# Patient Record
Sex: FEMALE | Race: BLACK OR AFRICAN AMERICAN | NOT HISPANIC OR LATINO | Employment: FULL TIME | URBAN - METROPOLITAN AREA
[De-identification: names, ages, dates, MRNs, and addresses within clinical notes are randomized per-mention and may not be internally consistent; named-entity substitution may affect disease eponyms.]

---

## 2017-11-15 ENCOUNTER — HOSPITAL ENCOUNTER (EMERGENCY)
Facility: HOSPITAL | Age: 42
Discharge: HOME | End: 2017-11-15
Attending: EMERGENCY MEDICINE
Payer: MEDICAID

## 2017-11-15 VITALS
RESPIRATION RATE: 18 BRPM | SYSTOLIC BLOOD PRESSURE: 153 MMHG | HEART RATE: 90 BPM | OXYGEN SATURATION: 96 % | BODY MASS INDEX: 43.49 KG/M2 | WEIGHT: 286 LBS | DIASTOLIC BLOOD PRESSURE: 93 MMHG | TEMPERATURE: 99 F

## 2017-11-15 DIAGNOSIS — L03.312 CELLULITIS OF BACK: ICD-10-CM

## 2017-11-15 DIAGNOSIS — L02.212 BACK ABSCESS: Primary | ICD-10-CM

## 2017-11-15 PROCEDURE — 10060 I&D ABSCESS SIMPLE/SINGLE: CPT

## 2017-11-15 PROCEDURE — 25000003 PHARM REV CODE 250: Performed by: EMERGENCY MEDICINE

## 2017-11-15 PROCEDURE — 99283 EMERGENCY DEPT VISIT LOW MDM: CPT | Mod: 25

## 2017-11-15 RX ORDER — LIDOCAINE HYDROCHLORIDE AND EPINEPHRINE 10; 10 MG/ML; UG/ML
10 INJECTION, SOLUTION INFILTRATION; PERINEURAL
Status: COMPLETED | OUTPATIENT
Start: 2017-11-15 | End: 2017-11-15

## 2017-11-15 RX ORDER — KETOROLAC TROMETHAMINE 10 MG/1
10 TABLET, FILM COATED ORAL
Status: COMPLETED | OUTPATIENT
Start: 2017-11-15 | End: 2017-11-15

## 2017-11-15 RX ORDER — HYDROCHLOROTHIAZIDE 25 MG/1
25 TABLET ORAL DAILY
COMMUNITY
End: 2020-01-24 | Stop reason: SDUPTHER

## 2017-11-15 RX ORDER — SULFAMETHOXAZOLE AND TRIMETHOPRIM 800; 160 MG/1; MG/1
1 TABLET ORAL 2 TIMES DAILY
Qty: 14 TABLET | Refills: 0 | Status: SHIPPED | OUTPATIENT
Start: 2017-11-15 | End: 2017-11-22

## 2017-11-15 RX ADMIN — LIDOCAINE HYDROCHLORIDE,EPINEPHRINE BITARTRATE 10 ML: 10; .01 INJECTION, SOLUTION INFILTRATION; PERINEURAL at 10:11

## 2017-11-15 RX ADMIN — KETOROLAC TROMETHAMINE 10 MG: 10 TABLET, FILM COATED ORAL at 11:11

## 2017-11-16 NOTE — DISCHARGE INSTRUCTIONS
For  CELLULITIS/ABSCESS, I advised patient to: keep area clean and dry; apply antibiotic ointment as prescribed; refrain from squeezing or irritating site; apply moist heat to help with pain and abscess drainage; and to take antibiotics as prescribed. Patient was instructed to follow up with primary care provider, urgent care facility, or the emergency room if symptoms worsen and they develop a fever greater than 102.5 °F, have pain unrelieved by OTC or prescription medications, and excessive swelling. Also instructed patient to follow up with provider in 24-48 hours for wound re-check and possible packing change.

## 2017-11-16 NOTE — ED NOTES
Procedure completed. Bulky gauze dressing applied . Patient notified that she needs to follow up in 2 days. Packing placed in lanced abscess. Patient tolerated procedure well. Awaiting  Discharge. At present.

## 2017-11-16 NOTE — ED PROVIDER NOTES
"Encounter Date: 11/15/2017       History     Chief Complaint   Patient presents with    Abscess     PT states "I have a boil on my back".     The history is provided by the patient.   Abscess    This is a new problem. The current episode started several days ago (4 days). The problem occurs rarely. The problem has been gradually worsening. The abscess is present on the back. Pain scale: mild. The abscess is characterized by redness, painfulness and swelling. Pertinent negatives include no anorexia, no decrease in physical activity, not sleeping less, not drinking less, no fever, no diarrhea, no vomiting, no congestion, no rhinorrhea, no sore throat, no decreased responsiveness and no cough. Her past medical history does not include atopy in family or skin abscesses in family. There were no sick contacts. She has received no recent medical care.     Review of patient's allergies indicates:  No Known Allergies  Past Medical History:   Diagnosis Date    Hypertension      Past Surgical History:   Procedure Laterality Date    CHOLECYSTECTOMY       History reviewed. No pertinent family history.  Social History   Substance Use Topics    Smoking status: Never Smoker    Smokeless tobacco: Not on file    Alcohol use No     Review of Systems   Constitutional: Negative for decreased responsiveness and fever.   HENT: Negative for congestion, rhinorrhea and sore throat.    Respiratory: Negative for cough and shortness of breath.    Cardiovascular: Negative for chest pain.   Gastrointestinal: Negative for anorexia, diarrhea, nausea and vomiting.   Genitourinary: Negative for dysuria.   Musculoskeletal: Negative for back pain.   Skin: Negative for rash.   Neurological: Negative for weakness.   Hematological: Does not bruise/bleed easily.   All other systems reviewed and are negative.      Physical Exam     Initial Vitals [11/15/17 2205]   BP Pulse Resp Temp SpO2   (!) 141/90 95 18 98.9 °F (37.2 °C) 98 %      MAP       107     " "    Physical Exam    Nursing note and vitals reviewed.  Constitutional: She appears well-developed and well-nourished.   HENT:   Head: Normocephalic and atraumatic.   Mouth/Throat: No oropharyngeal exudate.   Eyes: Conjunctivae and EOM are normal. Pupils are equal, round, and reactive to light.   Neck: Normal range of motion. Neck supple. No thyromegaly present.   Cardiovascular: Normal rate, regular rhythm, normal heart sounds and intact distal pulses. Exam reveals no gallop and no friction rub.    No murmur heard.  Pulmonary/Chest: Breath sounds normal. No respiratory distress. She has no wheezes. She has no rhonchi. She exhibits no tenderness.   Abdominal: Soft. Bowel sounds are normal. She exhibits no distension. There is no tenderness. There is no rebound and no guarding.   Musculoskeletal: Normal range of motion. She exhibits no edema or tenderness.   Lymphadenopathy:     She has no cervical adenopathy.   Neurological: She is alert and oriented to person, place, and time. She has normal strength. No cranial nerve deficit or sensory deficit.   Skin: Skin is warm and dry. No rash noted.        Psychiatric: She has a normal mood and affect. Her behavior is normal. Judgment and thought content normal.         ED Course   I & D - Incision and Drainage  Date/Time: 11/15/2017 10:30 PM  Performed by: PENNY MARIN JR  Authorized by: PENNY MARIN JR   Consent Done: Yes  Consent: Verbal consent obtained.  Risks and benefits: risks, benefits and alternatives were discussed  Consent given by: patient  Patient understanding: patient states understanding of the procedure being performed  Procedure consent: procedure consent matches procedure scheduled  Relevant documents: relevant documents present and verified  Site marked: the operative site was marked  Patient identity confirmed: , MRN, name, provided demographic data and verbally with patient  Time out: Immediately prior to procedure a "time out" was called to " verify the correct patient, procedure, equipment, support staff and site/side marked as required.  Type: abscess  Body area: trunk  Location details: back  Anesthesia: local infiltration    Anesthesia:  Local Anesthetic: lidocaine 1% with epinephrine  Anesthetic total: 8 mL  Patient sedated: no  Scalpel size: 11  Incision type: single straight  Complexity: simple  Drainage: pus,  bloody and  purulent  Drainage amount: copious  Wound treatment: incision,  wound left open,  drainage,  deloculation,  expression of material and  wound packed  Packing material: 1/4 in gauze  Complications: No  Estimated blood loss (mL): 4  Specimens: No  Implants: No  Patient tolerance: Patient tolerated the procedure well with no immediate complications        Labs Reviewed - No data to display       Vitals:    11/15/17 2205 11/15/17 2324   BP: (!) 141/90 (!) 153/93   Pulse: 95 90   Resp: 18 18   Temp: 98.9 °F (37.2 °C) 98.5 °F (36.9 °C)   TempSrc: Oral Oral   SpO2: 98% 96%   Weight: 129.7 kg (286 lb)            Imaging Results    None         Medications   lidocaine-EPINEPHrine 1%-1:100,000 injection 10 mL (10 mLs Other Given 11/15/17 2231)   ketorolac tablet 10 mg (10 mg Oral Given 11/15/17 2347)       11:10 PM - Re-evaluation: The patient is resting comfortably and is in no acute distress. She states that her symptoms have improved after treatment within ER. Discussed test results, shared treatment plan, specific conditions for return, and importance of follow up with patient and family.  She understands and agrees with the plan as discussed. Answered  her questions at this time. She has remained hemodynamically stable throughout the ED course and is appropriate for discharge home.     For  CELLULITIS/ABSCESS, I advised patient to: keep area clean and dry; apply antibiotic ointment as prescribed; refrain from squeezing or irritating site; apply moist heat to help with pain and abscess drainage; and to take antibiotics as prescribed.  Patient was instructed to follow up with primary care provider, urgent care facility, or the emergency room if symptoms worsen and they develop a fever greater than 102.5 °F, have pain unrelieved by OTC or prescription medications, and excessive swelling. Also instructed patient to follow up with provider in 24-48 hours for wound re-check and possible packing change.      Pre-hypertension/Hypertension: The pt has been informed that they may have pre-hypertension or hypertension based on a blood pressure reading in the ED. I recommend that the pt call the PCP listed on their discharge instructions or a physician of their choice this week to arrange f/u for further evaluation of possible pre-hypertension or hypertension.     Gayathri Fraga was given a handout which discussed their disease process, precautions, and instructions for follow-up and therapy.    Follow-up Information     [facility]. Schedule an appointment as soon as possible for a visit in 1 week.    Why:  or with your physician, For wound re-check  Contact information:  [id number]  [place] [address]  [phone]             [facility] - Internal Medicine. Schedule an appointment as soon as possible for a visit in 2 days.    Specialty:  Internal Medicine  Why:  or with your physician, For wound re-check  Contact information:  [address] [address] [id number] [phone]           [facility] In 2 days.    Specialty:  Emergency Medicine  Why:  As needed, If symptoms worsen, For wound re-check  Contact information:  [address] [address] [address]  [phone]                 Discharge Medication List as of 11/15/2017 11:10 PM      START taking these medications    Details   sulfamethoxazole-trimethoprim 800-160mg (BACTRIM DS) 800-160 mg Tab Take 1 tablet by mouth 2 (two) times daily., Starting Wed 11/15/2017, Until Wed 11/22/2017, Print                ED Diagnosis  1.  Back abscess    2. Cellulitis of back                             ED Course      Clinical Impression:   The primary encounter diagnosis was Back abscess. A diagnosis of Cellulitis of back was also pertinent to this visit.    Disposition:   Disposition: Discharged  Condition: Stable                        Anjel Gale Jr., MD  11/16/17 0254

## 2017-11-16 NOTE — ED NOTES
Patient medicated for pain prior to Dc. On arrival to room to DC patient she c/o pain. MD made aware, orders received and patient medicated. Patient ok to be Dc'd home post medication administration per Md. Patient amb out.

## 2017-11-16 NOTE — ED NOTES
Patient to the ER with the c/o of an abscess to middle of upper back area. She reports her sister squeezed it and said it was a Black head. Now patient has a red raised area noted to the middle of her back that she rates 10/10 pain. Denies fever. Area size is approx 6x6cm. No drainage noted.  Will continue to monitor.

## 2017-11-17 ENCOUNTER — HOSPITAL ENCOUNTER (EMERGENCY)
Facility: HOSPITAL | Age: 42
Discharge: HOME | End: 2017-11-17
Attending: EMERGENCY MEDICINE
Payer: MEDICAID

## 2017-11-17 VITALS
DIASTOLIC BLOOD PRESSURE: 108 MMHG | BODY MASS INDEX: 40.8 KG/M2 | TEMPERATURE: 99 F | OXYGEN SATURATION: 99 % | SYSTOLIC BLOOD PRESSURE: 186 MMHG | HEIGHT: 70 IN | HEART RATE: 86 BPM | WEIGHT: 285 LBS | RESPIRATION RATE: 20 BRPM

## 2017-11-17 DIAGNOSIS — L02.212 ABSCESS OF BACK: Primary | ICD-10-CM

## 2017-11-17 PROCEDURE — 99283 EMERGENCY DEPT VISIT LOW MDM: CPT

## 2017-11-17 NOTE — ED NOTES
Pt stable, in NAD, and states no further needs at this time.MD aware of vitals. Pt took home meds just PTA.  Pt to be d/c'd home.

## 2017-11-22 NOTE — ED PROVIDER NOTES
Encounter Date: 11/17/2017       History     Chief Complaint   Patient presents with    Abscess     back     Patient presents for evaluation of a cutaneous abscess having recently undergone I&D at the site in our ED. Lesion is located in the mid back. Onset was noted several days ago..  Symptoms have gradually worsened. Abscess has associated symptoms of spontaneous drainage. Patient does not have previous history of cutaneous abscesses. Patient does not have diabetes.          Review of patient's allergies indicates:  No Known Allergies  Past Medical History:   Diagnosis Date    Hypertension      Past Surgical History:   Procedure Laterality Date    CHOLECYSTECTOMY       History reviewed. No pertinent family history.  Social History   Substance Use Topics    Smoking status: Never Smoker    Smokeless tobacco: Never Used    Alcohol use No     Review of Systems   Constitutional: Negative for chills and fever.   HENT: Negative for congestion and rhinorrhea.    Respiratory: Negative for cough, chest tightness, shortness of breath and wheezing.    Cardiovascular: Negative for chest pain, palpitations and leg swelling.   Gastrointestinal: Negative for abdominal pain, constipation, diarrhea, nausea and vomiting.   Genitourinary: Negative for dysuria, frequency, urgency, vaginal bleeding and vaginal discharge.   Skin: Negative for color change and rash.   Allergic/Immunologic: Negative for immunocompromised state.   Neurological: Negative for dizziness, weakness and numbness.   Hematological: Negative for adenopathy. Does not bruise/bleed easily.   All other systems reviewed and are negative.      Physical Exam     Initial Vitals [11/17/17 0847]   BP Pulse Resp Temp SpO2   (!) 186/108 86 20 98.7 °F (37.1 °C) 99 %      MAP       134         Physical Exam    Nursing note and vitals reviewed.  Constitutional: She appears well-developed and well-nourished. She is not diaphoretic. No distress.   HENT:   Head: Normocephalic  and atraumatic.   Cardiovascular: Normal rate, regular rhythm, normal heart sounds and intact distal pulses.   Pulmonary/Chest: Breath sounds normal. No respiratory distress.   Neurological: She is alert and oriented to person, place, and time.   Skin: Skin is warm and dry.   ~1.5cm area of fluctuance that drains spontaneously with removal of packing; wound base appears viable; minimal surrounding induration and erythema         ED Course   Procedures  Labs Reviewed - No data to display          Medical Decision Making:   ED Management:  All findings were reviewed with the patient/family in detail.  Abscess appears adequately drained.  Patient advised of home wound care instructions to clean and repack the area daily until closed pending PCP FU.  All remaining questions and concerns were addressed at that time.  Patient has been counseled regarding the need for follow-up as well as the indication to return to the emergency room should new or worrisome developments occur.  Sriram Dubon MD  8:47 AM                     ED Course      Clinical Impression:   The encounter diagnosis was Abscess of back.                           Sriram Dubon MD  11/22/17 0824

## 2018-01-02 ENCOUNTER — HOSPITAL ENCOUNTER (EMERGENCY)
Facility: HOSPITAL | Age: 43
Discharge: HOME | End: 2018-01-02
Attending: EMERGENCY MEDICINE
Payer: MEDICAID

## 2018-01-02 VITALS
HEART RATE: 84 BPM | TEMPERATURE: 98 F | BODY MASS INDEX: 43.65 KG/M2 | HEIGHT: 68 IN | DIASTOLIC BLOOD PRESSURE: 98 MMHG | RESPIRATION RATE: 20 BRPM | OXYGEN SATURATION: 99 % | WEIGHT: 288 LBS | SYSTOLIC BLOOD PRESSURE: 176 MMHG

## 2018-01-02 DIAGNOSIS — B34.9 VIRAL ILLNESS: Primary | ICD-10-CM

## 2018-01-02 DIAGNOSIS — I10 ESSENTIAL HYPERTENSION: ICD-10-CM

## 2018-01-02 DIAGNOSIS — R68.89 FLU-LIKE SYMPTOMS: ICD-10-CM

## 2018-01-02 DIAGNOSIS — Z20.828 EXPOSURE TO INFLUENZA: ICD-10-CM

## 2018-01-02 PROBLEM — L02.212 BACK ABSCESS: Status: RESOLVED | Noted: 2017-11-15 | Resolved: 2018-01-02

## 2018-01-02 PROBLEM — L03.312 CELLULITIS OF BACK: Status: RESOLVED | Noted: 2017-11-15 | Resolved: 2018-01-02

## 2018-01-02 LAB
FLUAV AG SPEC QL IA: NEGATIVE
FLUBV AG SPEC QL IA: NEGATIVE
SPECIMEN SOURCE: NORMAL

## 2018-01-02 PROCEDURE — 99283 EMERGENCY DEPT VISIT LOW MDM: CPT

## 2018-01-02 PROCEDURE — 25000003 PHARM REV CODE 250: Performed by: NURSE PRACTITIONER

## 2018-01-02 PROCEDURE — 87400 INFLUENZA A/B EACH AG IA: CPT

## 2018-01-02 RX ORDER — LISINOPRIL 10 MG/1
20 TABLET ORAL
Status: COMPLETED | OUTPATIENT
Start: 2018-01-02 | End: 2018-01-02

## 2018-01-02 RX ORDER — OSELTAMIVIR PHOSPHATE 75 MG/1
75 CAPSULE ORAL 2 TIMES DAILY
Qty: 10 CAPSULE | Refills: 0 | Status: SHIPPED | OUTPATIENT
Start: 2018-01-02 | End: 2018-01-07

## 2018-01-02 RX ORDER — NIFEDIPINE 20 MG/1
20 CAPSULE ORAL EVERY 8 HOURS
COMMUNITY
End: 2020-01-24

## 2018-01-02 RX ORDER — LISINOPRIL 20 MG/1
20 TABLET ORAL DAILY
COMMUNITY
End: 2020-01-24

## 2018-01-02 RX ORDER — PROMETHAZINE HYDROCHLORIDE AND DEXTROMETHORPHAN HYDROBROMIDE 6.25; 15 MG/5ML; MG/5ML
5 SYRUP ORAL
Qty: 120 ML | Refills: 0 | Status: SHIPPED | OUTPATIENT
Start: 2018-01-02 | End: 2018-01-12

## 2018-01-02 RX ORDER — IBUPROFEN 200 MG
800 TABLET ORAL
Status: COMPLETED | OUTPATIENT
Start: 2018-01-02 | End: 2018-01-02

## 2018-01-02 RX ORDER — CLONIDINE HYDROCHLORIDE 0.2 MG/1
0.2 TABLET ORAL
Status: COMPLETED | OUTPATIENT
Start: 2018-01-02 | End: 2018-01-02

## 2018-01-02 RX ADMIN — IBUPROFEN 800 MG: 200 TABLET, FILM COATED ORAL at 06:01

## 2018-01-02 RX ADMIN — LISINOPRIL 20 MG: 10 TABLET ORAL at 06:01

## 2018-01-02 RX ADMIN — CLONIDINE HYDROCHLORIDE 0.2 MG: 0.2 TABLET ORAL at 06:01

## 2018-01-03 NOTE — ED PROVIDER NOTES
Encounter Date: 1/2/2018       History     Chief Complaint   Patient presents with    Cough     Pt reports being exposed to flu. Reports cough and headache, onset approx 2 days. Denies fever.      The history is provided by the patient.   URI   The primary symptoms include fever, headaches, sore throat, cough and myalgias. Primary symptoms do not include wheezing, abdominal pain, nausea, vomiting or rash. The current episode started two days ago. This is a new problem. The problem has not changed since onset.The fever began 1 to 2 days ago. The temperature was taken by no thermometer. The maximum temperature recorded prior to her arrival was unknown.   The headache began 2 days ago. The pain from the headache is at a severity of 7/10. Location/region(s) of the headache: frontal. The headache is not associated with aura, photophobia, eye pain, visual change, neck stiffness, paresthesias or loss of balance.   The sore throat began 2 days ago. The sore throat has been unchanged since its onset. The sore throat is not accompanied by trouble swallowing, drooling, hoarse voice or stridor. The sore throat pain is at a severity of 7/10.   The cough began 2 days ago. The cough is dry and non-productive.   Myalgias began 2 days ago. The myalgias have been unchanged since their onset. The myalgias are generalized. The myalgias are aching. The myalgias are not associated with weakness, tenderness or swelling. The myalgia pain is at a severity of 7/10.   The onset of the illness is associated with exposure to sick contacts (son was just diagnosed with influenza). Symptoms associated with the illness include chills, sinus pressure, congestion and rhinorrhea. The following treatments were addressed: Acetaminophen was not tried. A decongestant was not tried. NSAIDs were not tried.         PCP:   Jonny Matias MD        Review of patient's allergies indicates:  No Known Allergies  Past Medical History:   Diagnosis Date     Hypertension     Obesity      Past Surgical History:   Procedure Laterality Date    CHOLECYSTECTOMY       History reviewed. No pertinent family history.  Social History   Substance Use Topics    Smoking status: Never Smoker    Smokeless tobacco: Never Used    Alcohol use No     Review of Systems   Constitutional: Positive for chills and fever.   HENT: Positive for congestion, postnasal drip, rhinorrhea, sinus pressure, sneezing and sore throat. Negative for drooling, hoarse voice and trouble swallowing.    Eyes: Negative for photophobia and pain.   Respiratory: Positive for cough. Negative for shortness of breath, wheezing and stridor.    Cardiovascular: Negative for chest pain and palpitations.   Gastrointestinal: Negative for abdominal pain, diarrhea, nausea and vomiting.   Genitourinary: Negative for dysuria.   Musculoskeletal: Positive for myalgias. Negative for back pain and neck stiffness.   Skin: Negative for rash.   Neurological: Positive for headaches. Negative for dizziness, weakness, paresthesias and loss of balance.   Hematological: Does not bruise/bleed easily.       Physical Exam     Initial Vitals [01/02/18 1704]   BP Pulse Resp Temp SpO2   (!) 212/114 94 20 99 °F (37.2 °C) 96 %      MAP       146.67         Physical Exam    Nursing note and vitals reviewed.  Constitutional: She appears well-developed and well-nourished. She is Obese . She is cooperative. She appears ill (and tired). No distress.   HENT:   Head: Normocephalic and atraumatic.   Right Ear: Hearing, tympanic membrane, external ear and ear canal normal.   Left Ear: Hearing, tympanic membrane, external ear and ear canal normal.   Nose: Mucosal edema, rhinorrhea (clear) and sinus tenderness present.   Mouth/Throat: Uvula is midline and mucous membranes are normal. Posterior oropharyngeal erythema present.   Eyes: Conjunctivae, EOM and lids are normal. Pupils are equal, round, and reactive to light.   Neck: Trachea normal and normal  "range of motion. Neck supple.   Cardiovascular: Normal rate, regular rhythm, intact distal pulses and normal pulses.   Pulmonary/Chest: Effort normal and breath sounds normal. No respiratory distress. She has no wheezes. She has no rhonchi. She has no rales.   Abdominal: Soft. She exhibits no distension and no mass. There is no tenderness. There is no rebound and no guarding.   Musculoskeletal: Normal range of motion. She exhibits no edema or tenderness.   Patient has subjective complaints of generalized body aches.    Lymphadenopathy:     She has no cervical adenopathy.   Neurological: She is alert and oriented to person, place, and time. She has normal strength. Gait normal. GCS eye subscore is 4. GCS verbal subscore is 5. GCS motor subscore is 6.   Skin: Skin is warm, dry and intact. Capillary refill takes less than 2 seconds. No rash noted.   Psychiatric: She has a normal mood and affect. Her speech is normal and behavior is normal. Thought content normal.         ED Course   Procedures    ED Lab Results:   Results for orders placed or performed during the hospital encounter of 01/02/18   Influenza antigen Nasopharyngeal Swab   Result Value Ref Range    Influenza A Ag, EIA Negative Negative    Influenza B Ag, EIA Negative Negative    Flu A & B Source Nasopharyngeal Swab            ED Medications:   Medications   lisinopril tablet 20 mg (20 mg Oral Given 1/2/18 1809)   cloNIDine tablet 0.2 mg (0.2 mg Oral Given 1/2/18 1809)   ibuprofen tablet 800 mg (800 mg Oral Given 1/2/18 1830)       ED Course Vitals  Vitals:    01/02/18 1704 01/02/18 1904 01/02/18 1915   BP: (!) 212/114 (!) 184/105 (!) 176/98   BP Location: Left arm Left arm Left arm   Patient Position: Sitting Sitting Sitting   Pulse: 94 83 84   Resp: 20  20   Temp: 99 °F (37.2 °C) 98.4 °F (36.9 °C)    TempSrc: Oral Oral    SpO2: 96% 99% 99%   Weight: 130.6 kg (288 lb)     Height: 5' 8" (1.727 m)         1915 HOURS RE-EVALUATION & DISPOSITION:   Reassessment " at the time of disposition demonstrates that the patient is resting comfortably in no acute distress.  She has remained hemodynamically stable throughout the entire ED visit and is without objective evidence for acute process requiring urgent intervention or hospitalization. I discussed test results and provided counseling to patient with regard to condition, the treatment plan, specific conditions for return, and the importance of follow up.  Answered questions at this time. The patient is stable for discharge.               Medical Decision Making:   History:   Old Records Summarized: records from clinic visits.  Clinical Tests:   Lab Tests: Ordered and Reviewed    Additional MDM:   Hypertension: The patient has hypertensive urgency (systolic BP > 180 and/or diastolic BP > 110 with no end organ damage). Medications given: Catapres PO. Response: The patient's BP was controlled using oral medications. The patient's condition was felt to be stable.                   Clinical Impression:       ICD-10-CM ICD-9-CM   1. Viral illness B34.9 079.99   2. Essential hypertension I10 401.9   3. Exposure to influenza Z20.828 V01.79   4. Flu-like symptoms R68.89 780.99         Disposition:   Disposition: Discharged  Condition: Stable  I discussed with patient that the evaluation in the emergency department does not suggest any emergent or life threatening medical condition requiring immediate intervention beyond what was provided in the ED, and I believe patient is safe for discharge.  Regardless, an unremarkable evaluation in the ED does not preclude the development or presence of a serious of life threatening condition. As such, patient was instructed to return immediately for any worsening or change in current symptoms. I also discussed the results of my evaluation and diagnosis with patient and she concurs with the evaluation and management plan.  Detailed written and verbal instructions provided to patient and she expressed  a verbal understanding of the discharge instructions and overall management plan. Reiterated the importance of medication administration and safety and advised patient to follow up with primary care provider in 3-5 days or sooner if needed.  Also advised patient to return to the ER for any complications.     Regarding HYPERTENSION, I advised patient to: keep a record of blood pressure results; take your blood pressure medication exactly as directed without skipping doses; avoid medications that contain heart stimulants, including over-the-counter drugs such as decongestants; maintain a healthy weight; cut back on sodium intake (i.e., limit canned, dried, packaged, and fast foods and don't add salt to food); follow the DASH (Dietary Approaches to Stop Hypertension) eating plan which recommends vegetables, fruits, whole gains, and other heart healthy foods; begin an exercise program that includes  aerobic exercise 3 to 4 times a week for an average of 40 minutes at a time (with approval of cardiologist or primary care provider); limit drinks that contain alcohol and caffeine; control levels of emotional stress; and seek emergency care for any shortness of breath, chest pain, difficulty speaking, confusion, or visual changes.      Regarding INFLUENZA, for prevention, I advised patient to: clean hands with soap and water or an alcohol-based hand rub frequently;  cover mouth and nose with bend of elbow when coughing or sneezing; limit face-to-face contact with others;  maintain good ventilation in the home; follow proper cleaning and disposal procedures for tissues, linens, and eating utensils; and keep surfaces clean (especially bedside tables, surfaces in the bathroom, doorknobs, phones, and childrens toys) by wiping them down with disinfectants. For treatment, recommended that patient: get annual vaccination; get plenty of rest; drink clear fluids like water, broth, sports drinks, or electrolyte beverages; place  cool, damp washcloth on forehead, arms, and legs to reduce discomfort associated with a fever; use a humidifier; gargle with warm salt water; and take over-the-counter acetaminophen or ibuprofen for pain relief and fever control. I also discussed the viral origin of influenza and treatment limitations.     Patient presents with upper respiratory and flulike symptoms consistent with a viral etiology. Based on my assessment in the ED, I do not suspect any respiratory, airway, pulmonary, cardiovascular (including myocarditis), metabolic, CNS, medical, or surgical emergency medical condition. I have discussed with the patient and/or caregiver signs and symptoms for secondary bacterial infections, such as pneumonia. I believe that the patient's symptoms are most consistent with a viral illness. Patient is safe for discharge home with conservative therapy.  I recommended that the patient treat the symptoms and recommended that they:  Rest; drink plenty of clear fluids; use nasal saline spray to clear nasal drainage and help with nasal congestion; take an antihistamine (Allegra, Claritin, or Zyrtec) to help dry mucus and postnasal drip; take Mucinex or Mucinex DM for cough and chest congestion; take ibuprofen or acetaminophen for any fever, headache, body aches, or other pain; gargle with warm salt water gargles or lozenges for throat comfort; and follow up with primary care provider if there is no improvement or a worsening of symptoms.           Discharge Medication List as of 1/2/2018  7:16 PM      START taking these medications    Details   oseltamivir (TAMIFLU) 75 MG capsule Take 1 capsule (75 mg total) by mouth 2 (two) times daily., Starting Tue 1/2/2018, Until Sun 1/7/2018, Print      promethazine-dextromethorphan (PROMETHAZINE-DM) 6.25-15 mg/5 mL Syrp Take 5 mLs by mouth every 6 to 8 hours as needed., Starting Tue 1/2/2018, Until Fri 1/12/2018, Print             Follow-up Information     Call  Jonny Matias MD.     Specialty:  Family Medicine  Why:  If symptoms worsen or as needed  Contact information:  [address] [facility]  [facility] [address]  [phone]                                  Tommy Márquez NP  01/02/18 2011

## 2020-01-24 PROBLEM — I10 HYPERTENSION: Status: ACTIVE | Noted: 2020-01-24

## 2020-01-24 PROBLEM — E55.9 VITAMIN D DEFICIENCY: Status: ACTIVE | Noted: 2020-01-24

## 2020-09-25 PROBLEM — L73.2 HIDRADENITIS AXILLARIS: Status: ACTIVE | Noted: 2020-09-25

## 2020-10-16 ENCOUNTER — HOSPITAL ENCOUNTER (OUTPATIENT)
Dept: RADIOLOGY | Facility: HOSPITAL | Age: 45
Discharge: HOME | End: 2020-10-16
Attending: FAMILY MEDICINE
Payer: MEDICAID

## 2020-10-16 VITALS — HEIGHT: 68 IN | WEIGHT: 281.31 LBS | BODY MASS INDEX: 42.63 KG/M2

## 2020-10-16 DIAGNOSIS — D48.61 NEOPLASM OF UNCERTAIN BEHAVIOR OF RIGHT BREAST: ICD-10-CM

## 2020-10-16 DIAGNOSIS — D48.62 NEOPLASM OF UNCERTAIN BEHAVIOR OF LEFT BREAST: ICD-10-CM

## 2020-10-16 PROCEDURE — 77062 BREAST TOMOSYNTHESIS BI: CPT | Mod: 26,,, | Performed by: RADIOLOGY

## 2020-10-16 PROCEDURE — 77066 DX MAMMO INCL CAD BI: CPT | Mod: 26,,, | Performed by: RADIOLOGY

## 2020-10-16 PROCEDURE — 76642 PR US BREAST UNILAT LIMITED: ICD-10-PCS | Mod: 26,LT,, | Performed by: RADIOLOGY

## 2020-10-16 PROCEDURE — 76642 ULTRASOUND BREAST LIMITED: CPT | Mod: 26,LT,, | Performed by: RADIOLOGY

## 2020-10-16 PROCEDURE — 77062 BREAST TOMOSYNTHESIS BI: CPT | Mod: TC

## 2020-10-16 PROCEDURE — 77062 MAMMO DIGITAL DIAGNOSTIC BILAT WITH TOMO: ICD-10-PCS | Mod: 26,,, | Performed by: RADIOLOGY

## 2020-10-16 PROCEDURE — 77066 MAMMO DIGITAL DIAGNOSTIC BILAT WITH TOMO: ICD-10-PCS | Mod: 26,,, | Performed by: RADIOLOGY

## 2020-10-16 PROCEDURE — 76642 ULTRASOUND BREAST LIMITED: CPT | Mod: 26,RT,, | Performed by: RADIOLOGY

## 2020-10-16 PROCEDURE — 76642 ULTRASOUND BREAST LIMITED: CPT | Mod: TC,50

## 2021-03-05 ENCOUNTER — HOSPITAL ENCOUNTER (OUTPATIENT)
Dept: RADIOLOGY | Facility: HOSPITAL | Age: 46
Discharge: HOME | End: 2021-03-05
Attending: FAMILY MEDICINE
Payer: MEDICAID

## 2021-03-05 DIAGNOSIS — M25.562 CHRONIC PAIN OF LEFT KNEE: ICD-10-CM

## 2021-03-05 DIAGNOSIS — G89.29 CHRONIC PAIN OF LEFT KNEE: ICD-10-CM

## 2021-03-05 PROCEDURE — 73560 X-RAY EXAM OF KNEE 1 OR 2: CPT | Mod: TC,PO,RT

## 2021-03-05 PROCEDURE — 73562 XR KNEE ORTHO LEFT: ICD-10-PCS | Mod: 26,LT,, | Performed by: RADIOLOGY

## 2021-03-05 PROCEDURE — 73562 X-RAY EXAM OF KNEE 3: CPT | Mod: 26,LT,, | Performed by: RADIOLOGY

## 2021-03-05 PROCEDURE — 73560 X-RAY EXAM OF KNEE 1 OR 2: CPT | Mod: 26,RT,, | Performed by: RADIOLOGY

## 2021-03-05 PROCEDURE — 73560 XR KNEE ORTHO LEFT: ICD-10-PCS | Mod: 26,RT,, | Performed by: RADIOLOGY

## 2021-03-22 ENCOUNTER — OFFICE VISIT (OUTPATIENT)
Dept: PODIATRY | Facility: CLINIC | Age: 46
End: 2021-03-22
Payer: MEDICAID

## 2021-03-22 VITALS — HEIGHT: 68 IN | WEIGHT: 286.25 LBS | BODY MASS INDEX: 43.38 KG/M2

## 2021-03-22 DIAGNOSIS — R22.42 MASS OF FOOT, LEFT: ICD-10-CM

## 2021-03-22 PROCEDURE — 99213 OFFICE O/P EST LOW 20 MIN: CPT | Mod: PBBFAC | Performed by: PODIATRIST

## 2021-03-22 PROCEDURE — 99999 PR PBB SHADOW E&M-EST. PATIENT-LVL III: CPT | Mod: PBBFAC,,, | Performed by: PODIATRIST

## 2021-03-22 PROCEDURE — 99203 PR OFFICE/OUTPT VISIT, NEW, LEVL III, 30-44 MIN: ICD-10-PCS | Mod: S$PBB,,, | Performed by: PODIATRIST

## 2021-03-22 PROCEDURE — 99999 PR PBB SHADOW E&M-EST. PATIENT-LVL III: ICD-10-PCS | Mod: PBBFAC,,, | Performed by: PODIATRIST

## 2021-03-22 PROCEDURE — 99203 OFFICE O/P NEW LOW 30 MIN: CPT | Mod: S$PBB,,, | Performed by: PODIATRIST

## 2021-03-29 ENCOUNTER — HOSPITAL ENCOUNTER (EMERGENCY)
Facility: HOSPITAL | Age: 46
Discharge: HOME | End: 2021-03-29
Attending: EMERGENCY MEDICINE
Payer: MEDICAID

## 2021-03-29 VITALS
HEART RATE: 109 BPM | BODY MASS INDEX: 42.12 KG/M2 | HEIGHT: 69 IN | SYSTOLIC BLOOD PRESSURE: 131 MMHG | RESPIRATION RATE: 17 BRPM | DIASTOLIC BLOOD PRESSURE: 81 MMHG | WEIGHT: 284.38 LBS | TEMPERATURE: 99 F | OXYGEN SATURATION: 98 %

## 2021-03-29 DIAGNOSIS — U07.1 COVID-19 VIRUS DETECTED: ICD-10-CM

## 2021-03-29 DIAGNOSIS — R05.9 COUGH: ICD-10-CM

## 2021-03-29 DIAGNOSIS — U07.1 COVID-19: Primary | ICD-10-CM

## 2021-03-29 LAB
CTP QC/QA: YES
SARS-COV-2 RDRP RESP QL NAA+PROBE: POSITIVE

## 2021-03-29 PROCEDURE — U0002 COVID-19 LAB TEST NON-CDC: HCPCS | Mod: ER | Performed by: NURSE PRACTITIONER

## 2021-03-29 PROCEDURE — 99284 EMERGENCY DEPT VISIT MOD MDM: CPT | Mod: 25,ER

## 2021-03-29 RX ORDER — METHYLPREDNISOLONE 4 MG/1
TABLET ORAL
Qty: 1 PACKAGE | Refills: 0 | Status: SHIPPED | OUTPATIENT
Start: 2021-03-29 | End: 2021-04-19

## 2021-03-29 RX ORDER — PROMETHAZINE HYDROCHLORIDE AND DEXTROMETHORPHAN HYDROBROMIDE 6.25; 15 MG/5ML; MG/5ML
5 SYRUP ORAL EVERY 8 HOURS PRN
Qty: 120 ML | Refills: 0 | Status: SHIPPED | OUTPATIENT
Start: 2021-03-29 | End: 2021-04-05

## 2021-03-29 RX ORDER — PROMETHAZINE HYDROCHLORIDE AND DEXTROMETHORPHAN HYDROBROMIDE 6.25; 15 MG/5ML; MG/5ML
5 SYRUP ORAL EVERY 4 HOURS PRN
Qty: 120 ML | Refills: 0 | Status: SHIPPED | OUTPATIENT
Start: 2021-03-29 | End: 2021-03-29 | Stop reason: CLARIF

## 2021-03-29 RX ORDER — METHYLPREDNISOLONE 4 MG/1
TABLET ORAL
Qty: 1 PACKAGE | Refills: 0 | Status: SHIPPED | OUTPATIENT
Start: 2021-03-29 | End: 2021-03-29 | Stop reason: CLARIF

## 2021-04-29 ENCOUNTER — PATIENT MESSAGE (OUTPATIENT)
Dept: RESEARCH | Facility: HOSPITAL | Age: 46
End: 2021-04-29

## 2021-05-07 ENCOUNTER — HOSPITAL ENCOUNTER (OUTPATIENT)
Dept: RADIOLOGY | Facility: HOSPITAL | Age: 46
Discharge: HOME | End: 2021-05-07
Attending: FAMILY MEDICINE
Payer: MEDICAID

## 2021-05-07 VITALS — WEIGHT: 273.81 LBS | BODY MASS INDEX: 40.56 KG/M2 | HEIGHT: 69 IN

## 2021-05-07 DIAGNOSIS — D48.61 NEOPLASM OF UNCERTAIN BEHAVIOR OF RIGHT BREAST: ICD-10-CM

## 2021-05-07 DIAGNOSIS — D48.62 NEOPLASM OF UNCERTAIN BEHAVIOR OF LEFT BREAST: ICD-10-CM

## 2021-05-07 PROCEDURE — 77066 DX MAMMO INCL CAD BI: CPT | Mod: TC

## 2021-05-07 PROCEDURE — 77066 DX MAMMO INCL CAD BI: CPT | Mod: 26,,, | Performed by: RADIOLOGY

## 2021-05-07 PROCEDURE — 76642 US BREAST BILATERAL LIMITED: ICD-10-PCS | Mod: 26,50,, | Performed by: RADIOLOGY

## 2021-05-07 PROCEDURE — 76642 ULTRASOUND BREAST LIMITED: CPT | Mod: TC,50

## 2021-05-07 PROCEDURE — 76642 ULTRASOUND BREAST LIMITED: CPT | Mod: 26,50,, | Performed by: RADIOLOGY

## 2021-05-07 PROCEDURE — 77062 MAMMO DIGITAL DIAGNOSTIC BILAT WITH TOMO: ICD-10-PCS | Mod: 26,,, | Performed by: RADIOLOGY

## 2021-05-07 PROCEDURE — 77062 BREAST TOMOSYNTHESIS BI: CPT | Mod: 26,,, | Performed by: RADIOLOGY

## 2021-05-07 PROCEDURE — 77066 MAMMO DIGITAL DIAGNOSTIC BILAT WITH TOMO: ICD-10-PCS | Mod: 26,,, | Performed by: RADIOLOGY

## 2021-09-23 DIAGNOSIS — M25.511 BILATERAL SHOULDER PAIN, UNSPECIFIED CHRONICITY: Primary | ICD-10-CM

## 2021-09-23 DIAGNOSIS — M25.512 BILATERAL SHOULDER PAIN, UNSPECIFIED CHRONICITY: Primary | ICD-10-CM

## 2021-09-24 ENCOUNTER — HOSPITAL ENCOUNTER (OUTPATIENT)
Dept: RADIOLOGY | Facility: HOSPITAL | Age: 46
Discharge: HOME | End: 2021-09-24
Attending: PHYSICIAN ASSISTANT
Payer: MEDICAID

## 2021-09-24 DIAGNOSIS — M25.511 BILATERAL SHOULDER PAIN, UNSPECIFIED CHRONICITY: ICD-10-CM

## 2021-09-24 DIAGNOSIS — M25.512 BILATERAL SHOULDER PAIN, UNSPECIFIED CHRONICITY: ICD-10-CM

## 2021-09-24 PROCEDURE — 73030 X-RAY EXAM OF SHOULDER: CPT | Mod: 26,50,, | Performed by: RADIOLOGY

## 2021-09-24 PROCEDURE — 73030 X-RAY EXAM OF SHOULDER: CPT | Mod: TC,50

## 2021-09-24 PROCEDURE — 73030 XR SHOULDER COMPLETE 2 OR MORE VIEWS BILATERAL: ICD-10-PCS | Mod: 26,50,, | Performed by: RADIOLOGY

## 2021-11-11 ENCOUNTER — HOSPITAL ENCOUNTER (OUTPATIENT)
Dept: RADIOLOGY | Facility: HOSPITAL | Age: 46
Discharge: HOME | End: 2021-11-11
Attending: FAMILY MEDICINE
Payer: MEDICAID

## 2021-11-11 DIAGNOSIS — D48.62 NEOPLASM OF UNCERTAIN BEHAVIOR OF LEFT BREAST: ICD-10-CM

## 2021-11-11 DIAGNOSIS — D48.61 NEOPLASM OF UNCERTAIN BEHAVIOR OF RIGHT BREAST: ICD-10-CM

## 2021-11-11 PROCEDURE — 76642 US BREAST BILATERAL LIMITED: ICD-10-PCS | Mod: 26,50,, | Performed by: RADIOLOGY

## 2021-11-11 PROCEDURE — 77066 DX MAMMO INCL CAD BI: CPT | Mod: 26,,, | Performed by: RADIOLOGY

## 2021-11-11 PROCEDURE — 76642 ULTRASOUND BREAST LIMITED: CPT | Mod: 26,50,, | Performed by: RADIOLOGY

## 2021-11-11 PROCEDURE — 77062 BREAST TOMOSYNTHESIS BI: CPT | Mod: TC

## 2021-11-11 PROCEDURE — 76642 ULTRASOUND BREAST LIMITED: CPT | Mod: TC,50

## 2021-11-11 PROCEDURE — 77062 MAMMO DIGITAL DIAGNOSTIC BILAT WITH TOMO: ICD-10-PCS | Mod: 26,,, | Performed by: RADIOLOGY

## 2021-11-11 PROCEDURE — 77062 BREAST TOMOSYNTHESIS BI: CPT | Mod: 26,,, | Performed by: RADIOLOGY

## 2021-11-11 PROCEDURE — 77066 MAMMO DIGITAL DIAGNOSTIC BILAT WITH TOMO: ICD-10-PCS | Mod: 26,,, | Performed by: RADIOLOGY

## 2023-03-17 ENCOUNTER — HOSPITAL ENCOUNTER (EMERGENCY)
Facility: HOSPITAL | Age: 48
Discharge: HOME | End: 2023-03-17
Attending: EMERGENCY MEDICINE
Payer: MEDICAID

## 2023-03-17 VITALS
HEART RATE: 95 BPM | WEIGHT: 293 LBS | TEMPERATURE: 98 F | SYSTOLIC BLOOD PRESSURE: 172 MMHG | BODY MASS INDEX: 45.59 KG/M2 | DIASTOLIC BLOOD PRESSURE: 80 MMHG | RESPIRATION RATE: 16 BRPM | OXYGEN SATURATION: 98 %

## 2023-03-17 DIAGNOSIS — M79.672 FOOT PAIN, LEFT: Primary | ICD-10-CM

## 2023-03-17 DIAGNOSIS — R52 PAIN: ICD-10-CM

## 2023-03-17 PROCEDURE — 99284 EMERGENCY DEPT VISIT MOD MDM: CPT | Mod: ER

## 2023-03-17 RX ORDER — COLCHICINE 0.6 MG/1
TABLET ORAL
Qty: 20 TABLET | Refills: 0 | Status: SHIPPED | OUTPATIENT
Start: 2023-03-17

## 2023-03-17 RX ORDER — TRAMADOL HYDROCHLORIDE 50 MG/1
50 TABLET ORAL EVERY 6 HOURS PRN
Qty: 12 TABLET | Refills: 0 | Status: SHIPPED | OUTPATIENT
Start: 2023-03-17

## 2023-03-18 NOTE — ED PROVIDER NOTES
History      Chief Complaint   Patient presents with    Foot Pain     C/o left lateral foot pain onset 2 days ago. Pt has a hx of gout        Review of patient's allergies indicates:  No Known Allergies     HPI   HPI    3/18/2023, 3:52 PM   History obtained from the patient      History of Present Illness: Gayathri Fraga is a 48 y.o. female patient who presents to the Emergency Department for left lateral foot pain.  Denies injury.  She says she has history of gout and this does feel like a possible gout flare.. Symptoms are moderate in severity.     No further complaints or concerns at this time.           PCP: AYALA Dennison       Past Medical History:  Past Medical History:   Diagnosis Date    Allergic rhinitis     Epistaxis     Hypertension     Migraine     Obesity     Vitamin D deficiency          Past Surgical History:  Past Surgical History:   Procedure Laterality Date    CHOLECYSTECTOMY             Family History:  Family History   Problem Relation Age of Onset    Hypertension Mother            Social History:  Social History     Tobacco Use    Smoking status: Never    Smokeless tobacco: Never   Substance and Sexual Activity    Alcohol use: No    Drug use: No    Sexual activity: Not on file       ROS     Review of Systems   Constitutional:  Negative for chills and fever.   HENT:  Negative for facial swelling and trouble swallowing.    Eyes:  Negative for discharge, redness and visual disturbance.   Respiratory:  Negative for chest tightness and shortness of breath.    Cardiovascular:  Negative for chest pain and leg swelling.   Gastrointestinal:  Negative for diarrhea and vomiting.   Genitourinary:  Negative for decreased urine volume and dysuria.   Musculoskeletal:  Negative for joint swelling and neck stiffness.   Skin:  Negative for rash and wound.   Neurological:  Negative for syncope and facial asymmetry.   All other systems reviewed and are negative.    Physical Exam      Initial  Vitals [03/17/23 1619]   BP Pulse Resp Temp SpO2   (!) 172/80 95 16 98 °F (36.7 °C) 98 %      MAP       --         Physical Exam  Vital signs and nursing notes reviewed.  Constitutional: Patient is in NAD. Awake and alert. Well-developed and well-nourished.  Head: Atraumatic. Normocephalic.  Eyes: PERRL. EOM intact. Conjunctivae nl. No scleral icterus.  ENT: Mucous membranes are moist. Oropharynx is clear.  Neck: Supple. No JVD. No meningismus  Cardiovascular: Regular rate and rhythm. No murmurs, rubs, or gallops. Distal pulses are 2+ and symmetric.  Pulmonary/Chest: No respiratory distress. Clear to auscultation bilaterally. No wheezing, rales, or rhonchi.  Musculoskeletal: Moves all extremities. No edema.  Left lateral foot with mild tenderness and edema.  No erythema.  2+ distal pulses  Skin: Warm and dry.  Neurological: Awake and alert. No acute focal neurological deficits are appreciated.  Psychiatric: Normal affect. Good eye contact. Appropriate in content.      ED Course          Procedures  ED Vital Signs:  Vitals:    03/17/23 1619   BP: (!) 172/80   Pulse: 95   Resp: 16   Temp: 98 °F (36.7 °C)   TempSrc: Oral   SpO2: 98%   Weight: 136 kg (299 lb 13.2 oz)                 Imaging Results:  Imaging Results              X-Ray Foot Complete Left (Final result)  Result time 03/17/23 18:01:33      Final result by Minor Lopez MD (03/17/23 18:01:33)                   Impression:      No acute findings involving the left foot.      Electronically signed by: Minor Lopez  Date:    03/17/2023  Time:    18:01               Narrative:    EXAMINATION:  XR FOOT COMPLETE 3 VIEW LEFT    CLINICAL HISTORY:  .  Pain, unspecified    TECHNIQUE:  AP, lateral and oblique views of the left foot were performed.    COMPARISON:  None    FINDINGS:  Midfoot degenerative changes.  No acute fracture.  Dorsal and plantar calcaneal enthesophytes.  Lisfranc joint is maintained.                                         The Emergency  Provider reviewed the vital signs and test results, which are outlined above.    ED Discussion             Medication(s) given in the ER:  Medications - No data to display         Follow-up Information       AYALA Dennison In 2 days.    Specialty: Family Medicine  Contact information:  [address] [facility]  [place] [address]  [phone]                                    Medication List        START taking these medications      colchicine 0.6 mg tablet  Commonly known as: COLCRYS  Take one tablet TID on day one; then one tablet twice a day thereafter     traMADoL 50 mg tablet  Commonly known as: ULTRAM  Take 1 tablet (50 mg total) by mouth every 6 (six) hours as needed for Pain.            ASK your doctor about these medications      allopurinoL 300 MG tablet  Commonly known as: ZYLOPRIM  Take 1 tablet (300 mg total) by mouth once daily.     fluticasone propionate 50 mcg/actuation nasal spray  Commonly known as: FLONASE  1USE 1 TO 2 SPRAY(S) IN EACH NOSTRIL ONCE DAILY     hydroCHLOROthiazide 25 MG tablet  Commonly known as: HYDRODIURIL  Take 1 tablet (25 mg total) by mouth once daily.     lisinopriL 40 MG tablet  Commonly known as: PRINIVIL,ZESTRIL  Take 1 tablet (40 mg total) by mouth once daily.     montelukast 10 mg tablet  Commonly known as: SINGULAIR  Take 1 tablet (10 mg total) by mouth once daily.     nabumetone 500 MG tablet  Commonly known as: RELAFEN  Take 1 tablet (500 mg total) by mouth 2 (two) times daily as needed.     NIFEdipine 90 MG Tbsr  Commonly known as: ADALAT CC  TAKE 1 TABLET BY MOUTH AT BEDTIME FOR BLOOD PRESSURE     terbinafine  mg tablet  Commonly known as: LAMISIL               Where to Get Your Medications        These medications were sent to [id number] - [place] - [address]  [address] [place] [address]      Phone: [phone]   colchicine 0.6 mg tablet  traMADoL 50 mg tablet             Medical Decision  Making      Will treat as potential gout.  Patient to watch for erythema expanding to leg.  If so would suspect infection.      All findings were reviewed with the patient/family in detail.   All remaining questions and concerns were addressed at that time.  Patient/family has been counseled regarding the need for follow-up as well as the indication to return to the emergency room should new or worrisome developments occur.    Medical Decision Making:   Clinical Tests:   Radiological Study: Ordered and Reviewed   MDM                 Clinical Impression:        ICD-10-CM ICD-9-CM   1. Foot pain, left  M79.672 729.5   2. Pain  R52 780.96               Ashwini Ritter PA-C  03/18/23 1554

## 2023-07-18 DIAGNOSIS — Z12.11 SCREEN FOR COLON CANCER: Primary | ICD-10-CM

## 2023-07-20 ENCOUNTER — HOSPITAL ENCOUNTER (OUTPATIENT)
Dept: PREADMISSION TESTING | Facility: HOSPITAL | Age: 48
Discharge: HOME | End: 2023-07-20
Attending: INTERNAL MEDICINE
Payer: MEDICAID

## 2023-07-20 DIAGNOSIS — Z12.11 SCREEN FOR COLON CANCER: Primary | ICD-10-CM

## 2023-07-20 RX ORDER — DULAGLUTIDE 0.75 MG/.5ML
INJECTION, SOLUTION SUBCUTANEOUS
COMMUNITY
Start: 2023-04-08

## 2023-07-20 RX ORDER — SODIUM, POTASSIUM,MAG SULFATES 17.5-3.13G
1 SOLUTION, RECONSTITUTED, ORAL ORAL DAILY
Qty: 1 KIT | Refills: 0 | Status: SHIPPED | OUTPATIENT
Start: 2023-07-20 | End: 2023-07-22

## 2023-07-20 RX ORDER — GABAPENTIN 100 MG/1
200 CAPSULE ORAL 3 TIMES DAILY
COMMUNITY
Start: 2023-04-17

## 2023-07-25 ENCOUNTER — ANESTHESIA EVENT (OUTPATIENT)
Dept: ENDOSCOPY | Facility: HOSPITAL | Age: 48
End: 2023-07-25
Payer: MEDICAID

## 2023-07-25 NOTE — ANESTHESIA PREPROCEDURE EVALUATION
07/25/2023  Gayathri Fraga is a 48 y.o., female.  Patient Active Problem List   Diagnosis    Vitamin D deficiency    Hypertension    Hidradenitis axillaris     Past Surgical History:   Procedure Laterality Date    CHOLECYSTECTOMY             Pre-op Assessment    I have reviewed the Patient Summary Reports.     I have reviewed the Nursing Notes. I have reviewed the NPO Status.   I have reviewed the Medications.     Review of Systems  Anesthesia Hx:  No problems with previous Anesthesia  History of prior surgery of interest to airway management or planning: Previous anesthesia: General Denies Family Hx of Anesthesia complications.   Denies Personal Hx of Anesthesia complications.   Social:  Non-Smoker, No Alcohol Use    Cardiovascular:   Hypertension    Hepatic/GI:   Bowel Prep.    Neurological:   Headaches    Endocrine:  Morbid Obesity / BMI > 40      Physical Exam  General: Alert and Oriented    Airway:  Mallampati: II   Mouth Opening: Normal  TM Distance: Normal  Tongue: Normal  Neck ROM: Normal ROM    Dental:  Intact    Chest/Lungs:  Clear to auscultation, Normal Respiratory Rate    Heart:  Rate: Normal  Rhythm: Regular Rhythm        Anesthesia Plan  Type of Anesthesia, risks & benefits discussed:    Anesthesia Type: Gen Natural Airway  Intra-op Monitoring Plan: Standard ASA Monitors  Post Op Pain Control Plan: multimodal analgesia  Induction:  IV  Informed Consent: Informed consent signed with the Patient and all parties understand the risks and agree with anesthesia plan.  All questions answered. Patient consented to blood products? No  ASA Score: 3  Day of Surgery Review of History & Physical: H&P Update referred to the surgeon/provider.    Ready For Surgery From Anesthesia Perspective.     .

## 2023-07-27 ENCOUNTER — ANESTHESIA (OUTPATIENT)
Dept: ENDOSCOPY | Facility: HOSPITAL | Age: 48
End: 2023-07-27
Payer: MEDICAID

## 2023-07-27 ENCOUNTER — HOSPITAL ENCOUNTER (OUTPATIENT)
Facility: HOSPITAL | Age: 48
Discharge: HOME | End: 2023-07-27
Attending: INTERNAL MEDICINE | Admitting: INTERNAL MEDICINE
Payer: MEDICAID

## 2023-07-27 VITALS
HEART RATE: 88 BPM | SYSTOLIC BLOOD PRESSURE: 117 MMHG | WEIGHT: 274.06 LBS | TEMPERATURE: 98 F | RESPIRATION RATE: 18 BRPM | BODY MASS INDEX: 41.53 KG/M2 | DIASTOLIC BLOOD PRESSURE: 69 MMHG | HEIGHT: 68 IN | OXYGEN SATURATION: 99 %

## 2023-07-27 DIAGNOSIS — Z12.11 COLON CANCER SCREENING: Primary | ICD-10-CM

## 2023-07-27 LAB
B-HCG UR QL: NEGATIVE
CTP QC/QA: YES
POCT GLUCOSE: 81 MG/DL (ref 70–110)

## 2023-07-27 PROCEDURE — D9220A PRA ANESTHESIA: Mod: CRNA,,, | Performed by: NURSE ANESTHETIST, CERTIFIED REGISTERED

## 2023-07-27 PROCEDURE — 81025 URINE PREGNANCY TEST: CPT | Performed by: INTERNAL MEDICINE

## 2023-07-27 PROCEDURE — 00812 ANES LWR INTST SCR COLSC: CPT | Performed by: INTERNAL MEDICINE

## 2023-07-27 PROCEDURE — 63600175 PHARM REV CODE 636 W HCPCS: Performed by: NURSE ANESTHETIST, CERTIFIED REGISTERED

## 2023-07-27 PROCEDURE — 25000003 PHARM REV CODE 250: Performed by: NURSE ANESTHETIST, CERTIFIED REGISTERED

## 2023-07-27 PROCEDURE — G0121 COLON CA SCRN NOT HI RSK IND: HCPCS | Performed by: INTERNAL MEDICINE

## 2023-07-27 PROCEDURE — 25000003 PHARM REV CODE 250: Performed by: INTERNAL MEDICINE

## 2023-07-27 PROCEDURE — 63600175 PHARM REV CODE 636 W HCPCS: Performed by: INTERNAL MEDICINE

## 2023-07-27 PROCEDURE — D9220A PRA ANESTHESIA: Mod: ANES,,, | Performed by: ANESTHESIOLOGY

## 2023-07-27 PROCEDURE — D9220A PRA ANESTHESIA: ICD-10-PCS | Mod: ANES,,, | Performed by: ANESTHESIOLOGY

## 2023-07-27 PROCEDURE — 37000008 HC ANESTHESIA 1ST 15 MINUTES: Performed by: INTERNAL MEDICINE

## 2023-07-27 PROCEDURE — 45378 PR COLONOSCOPY,DIAGNOSTIC: ICD-10-PCS | Mod: ,,, | Performed by: INTERNAL MEDICINE

## 2023-07-27 PROCEDURE — D9220A PRA ANESTHESIA: ICD-10-PCS | Mod: CRNA,,, | Performed by: NURSE ANESTHETIST, CERTIFIED REGISTERED

## 2023-07-27 PROCEDURE — 45378 DIAGNOSTIC COLONOSCOPY: CPT | Mod: ,,, | Performed by: INTERNAL MEDICINE

## 2023-07-27 PROCEDURE — 37000009 HC ANESTHESIA EA ADD 15 MINS: Performed by: INTERNAL MEDICINE

## 2023-07-27 RX ORDER — DEXTROMETHORPHAN/PSEUDOEPHED 2.5-7.5/.8
DROPS ORAL
Status: DISCONTINUED | OUTPATIENT
Start: 2023-07-27 | End: 2023-07-27 | Stop reason: HOSPADM

## 2023-07-27 RX ORDER — SODIUM CHLORIDE, SODIUM LACTATE, POTASSIUM CHLORIDE, CALCIUM CHLORIDE 600; 310; 30; 20 MG/100ML; MG/100ML; MG/100ML; MG/100ML
INJECTION, SOLUTION INTRAVENOUS CONTINUOUS
Status: DISCONTINUED | OUTPATIENT
Start: 2023-07-27 | End: 2023-07-27 | Stop reason: HOSPADM

## 2023-07-27 RX ORDER — LIDOCAINE HYDROCHLORIDE 20 MG/ML
INJECTION INTRAVENOUS
Status: DISCONTINUED | OUTPATIENT
Start: 2023-07-27 | End: 2023-07-27

## 2023-07-27 RX ORDER — PROPOFOL 10 MG/ML
INJECTION, EMULSION INTRAVENOUS
Status: DISCONTINUED | OUTPATIENT
Start: 2023-07-27 | End: 2023-07-27

## 2023-07-27 RX ADMIN — PROPOFOL 50 MG: 10 INJECTION, EMULSION INTRAVENOUS at 01:07

## 2023-07-27 RX ADMIN — LIDOCAINE HYDROCHLORIDE 50 MG: 20 INJECTION INTRAVENOUS at 01:07

## 2023-07-27 RX ADMIN — SODIUM CHLORIDE, SODIUM LACTATE, POTASSIUM CHLORIDE, AND CALCIUM CHLORIDE: 600; 310; 30; 20 INJECTION, SOLUTION INTRAVENOUS at 01:07

## 2023-07-27 RX ADMIN — PROPOFOL 120 MG: 10 INJECTION, EMULSION INTRAVENOUS at 01:07

## 2023-07-27 NOTE — PROVATION PATIENT INSTRUCTIONS
Discharge Summary/Instructions after an Endoscopic Procedure  Patient Name: Gayathri Fraga  Patient MRN: [id number]  Patient YOB: 1975 Thursday, July 27, 2023  Manohar Roland MD  Dear patient,  As a result of recent federal legislation (The Federal Cures Act), you may   receive lab or pathology results from your procedure in your [facility]   account before your physician is able to contact you. Your physician or   their representative will relay the results to you with their   recommendations at their soonest availability.  Thank you,  RESTRICTIONS:  During your procedure today, you received medications for sedation.  These   medications may affect your judgment, balance and coordination.  Therefore,   for 24 hours, you have the following restrictions:   - DO NOT drive a car, operate machinery, make legal/financial decisions,   sign important papers or drink alcohol.    ACTIVITY:  Today: no heavy lifting, straining or running due to procedural   sedation/anesthesia.  The following day: return to full activity including work.  DIET:  Eat and drink normally unless instructed otherwise.     TREATMENT FOR COMMON SIDE EFFECTS:  - Mild abdominal pain, nausea, belching, bloating or excessive gas:  rest,   eat lightly and use a heating pad.  - Sore Throat: treat with throat lozenges and/or gargle with warm salt   water.  - Because air was used during the procedure, expelling large amounts of air   from your rectum or belching is normal.  - If a bowel prep was taken, you may not have a bowel movement for 1-3 days.    This is normal.  SYMPTOMS TO WATCH FOR AND REPORT TO YOUR PHYSICIAN:  1. Abdominal pain or bloating, other than gas cramps.  2. Chest pain.  3. Back pain.  4. Signs of infection such as: chills or fever occurring within 24 hours   after the procedure.  5. Rectal bleeding, which would show as bright red, maroon, or black stools.   (A tablespoon of blood from the rectum is not serious,  especially if   hemorrhoids are present.)  6. Vomiting.  7. Weakness or dizziness.  GO DIRECTLY TO THE NEAREST EMERGENCY ROOM IF YOU HAVE ANY OF THE FOLLOWING:      Difficulty breathing              Chills and/or fever over 101 F   Persistent vomiting and/or vomiting blood   Severe abdominal pain   Severe chest pain   Black, tarry stools   Bleeding- more than one tablespoon   Any other symptom or condition that you feel may need urgent attention  Your doctor recommends these additional instructions:  If any biopsies were taken, your doctors clinic will contact you in 1 to 2   weeks with any results.  - Discharge patient to home.   - Resume previous diet.   - Continue present medications.   - Repeat colonoscopy in 10 years for screening purposes.   - Return to referring physician.   - Patient has a contact number available for emergencies.  The signs and   symptoms of potential delayed complications were discussed with the   patient.  Return to normal activities tomorrow.  Written discharge   instructions were provided to the patient.  For questions, problems or results please call your physician Manohar Roland MD at Work:  [phone]  If you have any questions about the above instructions, call the GI   department at [phone] or call the endoscopy unit at [phone]   from 7am until 3 pm.  [facility] [place], EMERGENCY ROOM PHONE NUMBER:   [phone]  IF A COMPLICATION OR EMERGENCY SITUATION ARISES AND YOU ARE UNABLE TO REACH   YOUR PHYSICIAN - GO DIRECTLY TO THE EMERGENCY ROOM.  I have read or have had read to me these discharge instructions for my   procedure and have received a written copy.  I understand these   instructions and will follow-up with my physician if I have any questions.     __________________________________       _____________________________________  Nurse Signature                                          Patient/Designated   Responsible Party  Signature  Manohar Roland MD  7/27/2023 2:01:36 PM  This report has been verified and signed electronically.  Dear patient,  As a result of recent federal legislation (The Federal Cures Act), you may   receive lab or pathology results from your procedure in your [facility]   account before your physician is able to contact you. Your physician or   their representative will relay the results to you with their   recommendations at their soonest availability.  Thank you,  PROVATION

## 2023-07-27 NOTE — H&P
PRE PROCEDURE H&P    Patient Name: Gayathri Fraga  MRN: [id number]  : 1975  Date of Procedure:  2023  Referring Physician: Haris Castellon F*  Primary Physician: AYALA Dennison  Procedure Physician: Manohar Roland MD       Planned Procedure: Colonoscopy  Diagnosis: screening for colon cancer  Chief Complaint: Same as above    HPI: Patient is an 48 y.o. female is here for the above.     Last colonoscopy: Index colonoscopy   Family history: None   Anticoagulation: None     Past Medical History:   Past Medical History:   Diagnosis Date    Allergic rhinitis     Epistaxis     Hypertension     Migraine     Obesity     Vitamin D deficiency         Past Surgical History:  Past Surgical History:   Procedure Laterality Date    CHOLECYSTECTOMY          Home Medications:  Prior to Admission medications    Medication Sig Start Date End Date Taking? Authorizing Provider   hydroCHLOROthiazide (HYDRODIURIL) 25 MG tablet Take 1 tablet (25 mg total) by mouth once daily. 22  Yes AYALA Lopez   lisinopriL (PRINIVIL,ZESTRIL) 40 MG tablet Take 1 tablet (40 mg total) by mouth once daily. 22  Yes AYALA Lopez   allopurinoL (ZYLOPRIM) 300 MG tablet Take 1 tablet (300 mg total) by mouth once daily. 21   Jonny Matias MD   colchicine (COLCRYS) 0.6 mg tablet Take one tablet TID on day one; then one tablet twice a day thereafter 3/17/23   Ashwini Ritter PA-C   fluticasone propionate (FLONASE) 50 mcg/actuation nasal spray 1USE 1 TO 2 SPRAY(S) IN EACH NOSTRIL ONCE DAILY 22   AYALA Lopez   gabapentin (NEURONTIN) 100 MG capsule Take 200 mg by mouth 3 (three) times daily. 23   Historical Provider   montelukast (SINGULAIR) 10 mg tablet Take 1 tablet (10 mg total) by mouth once daily. 22   AYALA Lopez   nabumetone (RELAFEN) 500 MG tablet Take 1 tablet (500 mg total) by mouth 2 (two) times daily as needed. 22    "AYALA Lopez   NIFEdipine (ADALAT CC) 90 MG TbSR TAKE 1 TABLET BY MOUTH AT BEDTIME FOR BLOOD PRESSURE 8/22/22   AYALA Lopez   terbinafine HCL (LAMISIL) 250 mg tablet  10/17/21   Historical Provider   traMADoL (ULTRAM) 50 mg tablet Take 1 tablet (50 mg total) by mouth every 6 (six) hours as needed for Pain. 3/17/23   Ashwini Ritter PA-C   TRULICITY 0.75 mg/0.5 mL pen injector INJECT 1 PEN SUB-Q ONCE WEEKLY 4/8/23   Historical Provider        Allergies:  Review of patient's allergies indicates:  No Known Allergies     Social History:   Social History     Socioeconomic History    Marital status: Single   Tobacco Use    Smoking status: Never    Smokeless tobacco: Never   Substance and Sexual Activity    Alcohol use: No    Drug use: No       Family History:  Family History   Problem Relation Age of Onset    Hypertension Mother        ROS: No acute cardiac events, no acute respiratory complaints.     Physical Exam (all patients):    /83 (BP Location: Right arm, Patient Position: Lying)   Pulse 100   Temp 97.1 °F (36.2 °C) (Temporal)   Resp 16   Ht 5' 8" (1.727 m)   Wt 124.3 kg (274 lb 0.5 oz)   SpO2 98%   Breastfeeding No   BMI 41.67 kg/m²   Lungs: Clear to auscultation bilaterally, respirations unlabored  Heart: Regular rate and rhythm, S1 and S2 normal, no obvious murmurs  Abdomen:         Soft, non-tender, bowel sounds normal, no masses, no organomegaly    Lab Results   Component Value Date    WBC 8.2 10/17/2022    MCV 79 10/17/2022    RDW 15.6 (H) 10/17/2022     05/26/2022    GLU 83 05/26/2022    HGBA1C 6.2 (H) 05/26/2022    BUN 11 05/26/2022     05/26/2022    K 3.8 05/26/2022     05/26/2022        SEDATION PLAN: per anesthesia      History reviewed, vital signs satisfactory, cardiopulmonary status satisfactory, sedation options, risks and plans have been discussed with the patient  All their questions were answered and the patient agrees to the sedation " procedures as planned and the patient is deemed an appropriate candidate for the sedation as planned.    Procedure explained to patient, informed consent obtained and placed in chart.    Manohar Roland  7/27/2023  1:24 PM

## 2023-07-27 NOTE — TRANSFER OF CARE
"Anesthesia Transfer of Care Note    Patient: Gayathri Fraga    Procedure(s) Performed: Procedure(s) (LRB):  COLONOSCOPY (N/A)    Patient location: PACU    Anesthesia Type: general    Transport from OR: Transported from OR on room air with adequate spontaneous ventilation    Post pain: adequate analgesia    Post assessment: no apparent anesthetic complications and tolerated procedure well    Post vital signs: stable    Level of consciousness: awake, alert and oriented    Nausea/Vomiting: no nausea/vomiting    Complications: none    Transfer of care protocol was followed      Last vitals:   Visit Vitals  /69 (BP Location: Left arm, Patient Position: Lying)   Pulse 88   Temp 36.7 °C (98 °F) (Temporal)   Resp 18   Ht 5' 8" (1.727 m)   Wt 124.3 kg (274 lb 0.5 oz)   SpO2 99%   Breastfeeding No   BMI 41.67 kg/m²     "

## 2023-07-27 NOTE — PLAN OF CARE
Discharge instructions reviewed with patient and visitor. Handouts given & verbalized understanding with no further questions at this time. Dr Roland spoke to pt at bedside, reviewed procedure and findings, answered questions. MD telephone number provided per AVS sheet. VSS on RA, no pain or nausea noted, tolerating po fluids, no complaints noted. Fall precautions reviewed, consents in chart, PIV removed at this time.

## 2023-07-27 NOTE — ANESTHESIA POSTPROCEDURE EVALUATION
Anesthesia Post Evaluation    Patient: Gayathri Fraga    Procedure(s) Performed: Procedure(s) (LRB):  COLONOSCOPY (N/A)    Final Anesthesia Type: general      Patient location during evaluation: PACU  Patient participation: Yes- Able to Participate  Level of consciousness: awake and alert and oriented  Post-procedure vital signs: reviewed and stable  Pain management: adequate  Airway patency: patent    PONV status at discharge: No PONV  Anesthetic complications: no      Cardiovascular status: blood pressure returned to baseline, stable and hemodynamically stable  Respiratory status: unassisted  Hydration status: euvolemic  Follow-up not needed.          Vitals Value Taken Time   /69 07/27/23 1418   Temp 36.7 °C (98 °F) 07/27/23 1348   Pulse 88 07/27/23 1418   Resp 18 07/27/23 1418   SpO2 99 % 07/27/23 1418         Event Time   Out of Recovery 14:18:00         Pain/Omkar Score: Omkar Score: 10 (7/27/2023  2:18 PM)

## 2025-05-19 ENCOUNTER — HOSPITAL ENCOUNTER (OUTPATIENT)
Dept: RADIOLOGY | Facility: HOSPITAL | Age: 50
Discharge: HOME | End: 2025-05-19
Attending: NURSE PRACTITIONER

## 2025-05-19 VITALS — HEIGHT: 68 IN | BODY MASS INDEX: 41.52 KG/M2 | WEIGHT: 274 LBS

## 2025-05-19 DIAGNOSIS — Z12.31 SCREENING MAMMOGRAM, ENCOUNTER FOR: ICD-10-CM

## 2025-05-19 PROCEDURE — 77063 BREAST TOMOSYNTHESIS BI: CPT | Mod: 26,,, | Performed by: RADIOLOGY

## 2025-05-19 PROCEDURE — 77067 SCR MAMMO BI INCL CAD: CPT | Mod: TC,PO

## 2025-05-19 PROCEDURE — 77067 SCR MAMMO BI INCL CAD: CPT | Mod: 26,,, | Performed by: RADIOLOGY
